# Patient Record
Sex: FEMALE
[De-identification: names, ages, dates, MRNs, and addresses within clinical notes are randomized per-mention and may not be internally consistent; named-entity substitution may affect disease eponyms.]

---

## 2019-01-20 ENCOUNTER — TRANSCRIPTION ENCOUNTER (OUTPATIENT)
Age: 64
End: 2019-01-20

## 2021-02-26 ENCOUNTER — TRANSCRIPTION ENCOUNTER (OUTPATIENT)
Age: 66
End: 2021-02-26

## 2022-11-22 PROBLEM — Z00.00 ENCOUNTER FOR PREVENTIVE HEALTH EXAMINATION: Status: ACTIVE | Noted: 2022-11-22

## 2022-11-29 ENCOUNTER — NON-APPOINTMENT (OUTPATIENT)
Age: 67
End: 2022-11-29

## 2022-11-29 ENCOUNTER — APPOINTMENT (OUTPATIENT)
Dept: CARDIOLOGY | Facility: CLINIC | Age: 67
End: 2022-11-29

## 2022-11-29 VITALS
DIASTOLIC BLOOD PRESSURE: 79 MMHG | HEART RATE: 73 BPM | WEIGHT: 136 LBS | HEIGHT: 60 IN | BODY MASS INDEX: 26.7 KG/M2 | TEMPERATURE: 97.8 F | OXYGEN SATURATION: 97 % | SYSTOLIC BLOOD PRESSURE: 124 MMHG

## 2022-11-29 DIAGNOSIS — H57.10 OCULAR PAIN, UNSPECIFIED EYE: ICD-10-CM

## 2022-11-29 DIAGNOSIS — R94.31 ABNORMAL ELECTROCARDIOGRAM [ECG] [EKG]: ICD-10-CM

## 2022-11-29 PROCEDURE — 93000 ELECTROCARDIOGRAM COMPLETE: CPT

## 2022-11-29 PROCEDURE — 99204 OFFICE O/P NEW MOD 45 MIN: CPT | Mod: 25

## 2022-11-29 NOTE — DISCUSSION/SUMMARY
[FreeTextEntry1] : 67F w DM HLD presents to establish care\par \par 1. eye pain\par -unclear etiology\par -no vision loss\par -pt with several risk factors for ISSAC, will check carotid ultrasound\par \par 2. abnormal EKG\par -poor r wave progression\par -multiple risk factors  for CAD\par -will check tte to r/o structural heart dz, LV WMA/\par \par f/u after initial testing\par >45 min spent on complete encounter

## 2022-11-29 NOTE — HISTORY OF PRESENT ILLNESS
[FreeTextEntry1] : 67F w DM HLD presents to establish care\par Sent in by:\par PMD:\par \par pt's son john present for complete encounter today. pt Afghan speaking. \par \par pt endorsing R eye pain and redness, recommended by her optometrist to see cardiology. \par pt states vision is ok, but feels like she has sand in her eye. denies a sensation of temporary vison loss "curtains coming down" states occasional HA. \par \par denies CP, SOB, at rest or on exertion. Denies palpitations, dizziness, diaphoresis, syncope, LE edema, orthopnea\par \par \par Prev cardiac history: none\par \par Exercise: none\par Diet: none\par \par Previous cardiac testing: none\par Recent labs:\par \par \par EKG: SR 66 poor r wave proigression\par \par \par Med hx: HLD DM\par OBGYN hx:  6 children.  No Hx of gestational DM, gestational HTN, preeclampsia.  + Hx of miscarriage. Currently post menopause. \par Sx hx: c/s. thyroidectomy	\par Family hx: M: CAD\par Social hx: originally from [facility], now lives in [place] wit hhusband and son's family. no tob/etoh/drugs\par Meds: enalapril (pt doesn’t remember complete list)\par Allergies: nkda \par

## 2022-12-08 ENCOUNTER — APPOINTMENT (OUTPATIENT)
Dept: CARDIOLOGY | Facility: CLINIC | Age: 67
End: 2022-12-08

## 2022-12-22 ENCOUNTER — APPOINTMENT (OUTPATIENT)
Dept: CARDIOLOGY | Facility: CLINIC | Age: 67
End: 2022-12-22
Payer: MEDICARE

## 2022-12-22 PROCEDURE — 93880 EXTRACRANIAL BILAT STUDY: CPT

## 2022-12-22 PROCEDURE — 93306 TTE W/DOPPLER COMPLETE: CPT

## 2024-07-06 ENCOUNTER — NON-APPOINTMENT (OUTPATIENT)
Age: 69
End: 2024-07-06

## 2024-08-05 ENCOUNTER — APPOINTMENT (OUTPATIENT)
Dept: VASCULAR SURGERY | Facility: CLINIC | Age: 69
End: 2024-08-05

## 2024-08-05 PROBLEM — Z78.9 NON-SMOKER: Status: ACTIVE | Noted: 2024-08-05

## 2024-08-05 PROBLEM — I83.893 VARICOSE VEINS OF BILATERAL LOWER EXTREMITIES WITH OTHER COMPLICATIONS: Status: ACTIVE | Noted: 2024-08-05

## 2024-08-05 PROCEDURE — 93970 EXTREMITY STUDY: CPT

## 2024-08-05 PROCEDURE — 99203 OFFICE O/P NEW LOW 30 MIN: CPT

## 2024-08-05 NOTE — CONSULT LETTER
[Dear  ___] : Dear  [unfilled], [Consult Letter:] : I had the pleasure of evaluating your patient, [unfilled]. [Please see my note below.] : Please see my note below. [Consult Closing:] : Thank you very much for allowing me to participate in the care of this patient.  If you have any questions, please do not hesitate to contact me. [Sincerely,] : Sincerely, [FreeTextEntry1] : She presented to me for lower extremity pain, easy bruising, varicose veins.  On exam, she had palpable lower extremity pulses with lower extremity varicose veins.  Venous duplex showed no evidence of deep venous thrombosis or superficial venous thrombophlebitis.  She has a bilateral venous insufficiency.  We discussed management options including the use of compression stockings, leg elevation, and ambulation.  We will trial medical therapy for the management of venous insufficiency.  I plan to see her again in 3 months for evaluation. [FreeTextEntry3] :     Cherelle Hughes MD, FACS, [facility] Division of Vascular & Endovascular Surgery [facility], Department of Surgery

## 2024-08-05 NOTE — ASSESSMENT
[FreeTextEntry1] : ELLIOTT KNOWLES is a 69 year old female presents for evaluation.   > Venous insufficiency, CEAP C2 - Reviewed results of duplex with patient and granddaughter.  There is evidence of bilateral venous insufficiency. - Discussed management options including medical therapy and intervention. Will trial medical therapy at this time. - For symptom management, recommend use of compression stockings (20-30 mmhg, prescription given), leg elevation, and ambulation. - Follow up in 3 months.   DISPLAY PLAN FREE TEXT

## 2024-08-05 NOTE — DATA REVIEWED
[FreeTextEntry1] : 8/5/2024-bilateral lower extremity venous duplex Negative for DVT or SVT bilaterally. Right-reflux noted in the GSV measuring 6.2 mm at the junction, 5.5 mm proximally, reflux greater than 0.5 seconds down to the calf.  Varicose veins measuring up to 4.1 mm in size. Left-reflux noted in the GSV measuring 7.2 mm at the junction, 6.5 mm proximally, reflux greater than 0.5 seconds down to the calf, varicose veins measuring up to 4.1 mm

## 2024-08-05 NOTE — HISTORY OF PRESENT ILLNESS
[FreeTextEntry1] : ELLIOTT KNOWLES is a 69 year old female who presents for evaluation of left leg bruising and swelling.   Referred by Dr. Coats.   Patient is Urdu speaking. Granddaughter, Syed, acting as  per patient's request.  Two months ago, the patient woke up with bruising on the left leg without history of trauma and swelling. She underwent duplex which was negative for dvt and was referred to vascular surgery.  At baseline, the patient reports several year history of varicose veins, worse on the left leg. There is one on the left leg that gets very swollen. She has chronic leg pain with the painful varicose veins, particularly on the left leg. She reports nighttime cramps in her leg with leg heaviness and tightness.  Personal history of DVT - None Family history of DVT - None Family history of venous insufficiency or varicose veins - mom had significant leg swelling Current compression stocking usage - No Has 6 children Worked in laundry so stood for work  + PMH: HTN, HLD, hypothyroidism, osteoporosis + PSH: , thyroidectomy for benign nodule + FH: diabetes, htn + SH: never smoker, no etoh use + Aller: NKDA  PCP is Dr. Nessa Coats.

## 2024-08-05 NOTE — PHYSICAL EXAM
[2+] : left 2+ [Ankle Swelling (On Exam)] : present [Ankle Swelling Bilaterally] : bilaterally  [Ankle Swelling On The Right] : mild [Varicose Veins Of Lower Extremities] : present [Ankle Swelling On The Left] : moderate [Calm] : calm [] : not present [de-identified] : Well-appearing  [de-identified] : EOMI, anicteric  [de-identified] : soft, nt, nd  [de-identified] : moves all extremities  [de-identified] : no wounds on bilateral feet [de-identified] : A&Ox4

## 2024-11-06 ENCOUNTER — APPOINTMENT (OUTPATIENT)
Facility: CLINIC | Age: 69
End: 2024-11-06
Payer: MEDICARE

## 2024-11-06 ENCOUNTER — NON-APPOINTMENT (OUTPATIENT)
Age: 69
End: 2024-11-06

## 2024-11-06 VITALS
HEART RATE: 82 BPM | BODY MASS INDEX: 27.48 KG/M2 | DIASTOLIC BLOOD PRESSURE: 72 MMHG | SYSTOLIC BLOOD PRESSURE: 128 MMHG | HEIGHT: 60 IN | OXYGEN SATURATION: 96 % | WEIGHT: 140 LBS

## 2024-11-06 DIAGNOSIS — K62.5 HEMORRHAGE OF ANUS AND RECTUM: ICD-10-CM

## 2024-11-06 DIAGNOSIS — K59.00 CONSTIPATION, UNSPECIFIED: ICD-10-CM

## 2024-11-06 PROCEDURE — 99203 OFFICE O/P NEW LOW 30 MIN: CPT

## 2024-11-06 RX ORDER — POLYETHYLENE GLYCOL 3350 17 G/17G
17 POWDER, FOR SOLUTION ORAL DAILY
Qty: 30 | Refills: 5 | Status: ACTIVE | COMMUNITY
Start: 2024-11-06 | End: 1900-01-01

## 2024-11-06 RX ORDER — SODIUM SULFATE, POTASSIUM SULFATE AND MAGNESIUM SULFATE 1.6; 3.13; 17.5 G/177ML; G/177ML; G/177ML
17.5-3.13-1.6 SOLUTION ORAL
Qty: 1 | Refills: 0 | Status: ACTIVE | COMMUNITY
Start: 2024-11-06 | End: 1900-01-01

## 2025-04-10 ENCOUNTER — RESULT REVIEW (OUTPATIENT)
Age: 70
End: 2025-04-10

## 2025-04-10 ENCOUNTER — OUTPATIENT (OUTPATIENT)
Dept: OUTPATIENT SERVICES | Facility: HOSPITAL | Age: 70
LOS: 1 days | End: 2025-04-10
Payer: MEDICARE

## 2025-04-10 ENCOUNTER — APPOINTMENT (OUTPATIENT)
Dept: GASTROENTEROLOGY | Facility: HOSPITAL | Age: 70
End: 2025-04-10

## 2025-04-10 ENCOUNTER — TRANSCRIPTION ENCOUNTER (OUTPATIENT)
Age: 70
End: 2025-04-10

## 2025-04-10 VITALS
SYSTOLIC BLOOD PRESSURE: 142 MMHG | DIASTOLIC BLOOD PRESSURE: 75 MMHG | HEART RATE: 85 BPM | OXYGEN SATURATION: 97 % | TEMPERATURE: 98 F | RESPIRATION RATE: 17 BRPM

## 2025-04-10 VITALS
HEART RATE: 80 BPM | WEIGHT: 139.99 LBS | SYSTOLIC BLOOD PRESSURE: 134 MMHG | HEIGHT: 64 IN | OXYGEN SATURATION: 100 % | TEMPERATURE: 98 F | RESPIRATION RATE: 16 BRPM | DIASTOLIC BLOOD PRESSURE: 68 MMHG

## 2025-04-10 DIAGNOSIS — K62.5 HEMORRHAGE OF ANUS AND RECTUM: ICD-10-CM

## 2025-04-10 DIAGNOSIS — Z12.11 ENCOUNTER FOR SCREENING FOR MALIGNANT NEOPLASM OF COLON: ICD-10-CM

## 2025-04-10 LAB — GLUCOSE BLDC GLUCOMTR-MCNC: 126 MG/DL — HIGH (ref 70–99)

## 2025-04-10 PROCEDURE — 45380 COLONOSCOPY AND BIOPSY: CPT

## 2025-04-10 RX ORDER — SODIUM CHLORIDE 9 G/1000ML
1000 INJECTION, SOLUTION INTRAVENOUS
Refills: 0 | Status: DISCONTINUED | OUTPATIENT
Start: 2025-04-10 | End: 2025-04-11

## 2025-04-10 NOTE — ASU DISCHARGE PLAN (ADULT/PEDIATRIC) - FINANCIAL ASSISTANCE
[facility] provides services at a reduced cost to those who are determined to be eligible through [facility]’s financial assistance program. Information regarding [facility]’s financial assistance program can be found by going to [email or web address] or by calling [phone].

## 2025-04-10 NOTE — BRIEF OPERATIVE NOTE - OPERATION/FINDINGS
Final colonoscopy report - see photos in paper chart  Indication - screening  Exam to cecum  Prep adequate  Withdrawal 12 minutes  Findings:  - small internal hemorrhoids  - moderate left sided diverticulosis  - 2mm transverse polyp removed with jumbo forceps and retrieved    Recommend:  - resume diet  - await pathology  - repeat colonoscopy for surveillance in 7 years

## 2025-04-10 NOTE — ASU PATIENT PROFILE, ADULT - FALL HARM RISK - UNIVERSAL INTERVENTIONS
Bed in lowest position, wheels locked, appropriate side rails in place/Call bell, personal items and telephone in reach/Instruct patient to call for assistance before getting out of bed or chair/Non-slip footwear when patient is out of bed/[place] to call system/Physically safe environment - no spills, clutter or unnecessary equipment/Purposeful Proactive Rounding/Room/bathroom lighting operational, light cord in reach

## 2025-04-11 LAB — SURGICAL PATHOLOGY STUDY: SIGNIFICANT CHANGE UP

## 2025-04-17 DIAGNOSIS — I10 ESSENTIAL (PRIMARY) HYPERTENSION: ICD-10-CM

## 2025-04-17 DIAGNOSIS — Z12.11 ENCOUNTER FOR SCREENING FOR MALIGNANT NEOPLASM OF COLON: ICD-10-CM

## 2025-04-17 DIAGNOSIS — K57.30 DIVERTICULOSIS OF LARGE INTESTINE WITHOUT PERFORATION OR ABSCESS WITHOUT BLEEDING: ICD-10-CM

## 2025-04-17 DIAGNOSIS — K64.8 OTHER HEMORRHOIDS: ICD-10-CM

## 2025-04-17 DIAGNOSIS — D12.3 BENIGN NEOPLASM OF TRANSVERSE COLON: ICD-10-CM

## 2025-09-19 ENCOUNTER — NON-APPOINTMENT (OUTPATIENT)
Age: 70
End: 2025-09-19

## (undated) DEVICE — FORCEP RADIAL JAW 4 240CM DISP

## (undated) DEVICE — TUBING MEDI-VAC W MAXIGRIP CONNECTORS 1/4"X6'

## (undated) DEVICE — TUBING IV SET GRAVITY 3Y 100" MACRO

## (undated) DEVICE — GOWN IMPERV BREATHABLE XL

## (undated) DEVICE — ELCTR ECG CONDUCTIVE ADHESIVE

## (undated) DEVICE — TUBING SUCTION NONCONDUCTIVE 6MM X 12FT

## (undated) DEVICE — Device

## (undated) DEVICE — CONTAINER FORMALIN 80ML YELLOW

## (undated) DEVICE — KIT ENDO PROCEDURE CUST W/VLV

## (undated) DEVICE — TUBING HYBRID CO2